# Patient Record
Sex: MALE | Race: WHITE | NOT HISPANIC OR LATINO | Employment: OTHER | URBAN - METROPOLITAN AREA
[De-identification: names, ages, dates, MRNs, and addresses within clinical notes are randomized per-mention and may not be internally consistent; named-entity substitution may affect disease eponyms.]

---

## 2024-01-15 ENCOUNTER — OFFICE VISIT (OUTPATIENT)
Dept: INTERNAL MEDICINE CLINIC | Facility: OTHER | Age: 59
End: 2024-01-15
Payer: MEDICARE

## 2024-01-15 VITALS
BODY MASS INDEX: 24.07 KG/M2 | TEMPERATURE: 98.1 F | OXYGEN SATURATION: 98 % | HEIGHT: 68 IN | HEART RATE: 72 BPM | DIASTOLIC BLOOD PRESSURE: 98 MMHG | WEIGHT: 158.8 LBS | SYSTOLIC BLOOD PRESSURE: 148 MMHG

## 2024-01-15 DIAGNOSIS — Z76.89 ENCOUNTER TO ESTABLISH CARE: ICD-10-CM

## 2024-01-15 DIAGNOSIS — Z11.4 SCREENING FOR HIV (HUMAN IMMUNODEFICIENCY VIRUS): ICD-10-CM

## 2024-01-15 DIAGNOSIS — Z91.51 HISTORY OF SUICIDE ATTEMPT: ICD-10-CM

## 2024-01-15 DIAGNOSIS — F33.1 MODERATE RECURRENT MAJOR DEPRESSION (HCC): ICD-10-CM

## 2024-01-15 DIAGNOSIS — Z11.59 NEED FOR HEPATITIS C SCREENING TEST: ICD-10-CM

## 2024-01-15 DIAGNOSIS — I10 PRIMARY HYPERTENSION: ICD-10-CM

## 2024-01-15 DIAGNOSIS — Z12.5 SCREENING FOR MALIGNANT NEOPLASM OF PROSTATE: ICD-10-CM

## 2024-01-15 DIAGNOSIS — F33.1 MODERATE EPISODE OF RECURRENT MAJOR DEPRESSIVE DISORDER (HCC): Primary | ICD-10-CM

## 2024-01-15 DIAGNOSIS — Z12.11 ENCOUNTER FOR SCREENING FOR MALIGNANT NEOPLASM OF COLON: ICD-10-CM

## 2024-01-15 DIAGNOSIS — Z13.220 SCREENING FOR LIPID DISORDERS: ICD-10-CM

## 2024-01-15 DIAGNOSIS — Z83.719 FAMILY HX COLONIC POLYPS: ICD-10-CM

## 2024-01-15 DIAGNOSIS — F19.11 HX OF DRUG ABUSE (HCC): ICD-10-CM

## 2024-01-15 PROBLEM — F33.0 MILD EPISODE OF RECURRENT MAJOR DEPRESSIVE DISORDER (HCC): Status: ACTIVE | Noted: 2023-06-20

## 2024-01-15 PROBLEM — T14.91XA SUICIDE ATTEMPT (HCC): Status: ACTIVE | Noted: 2024-01-15

## 2024-01-15 PROCEDURE — 99204 OFFICE O/P NEW MOD 45 MIN: CPT

## 2024-01-15 RX ORDER — LISINOPRIL 10 MG/1
10 TABLET ORAL DAILY
Qty: 30 TABLET | Refills: 1 | Status: SHIPPED | OUTPATIENT
Start: 2024-01-15

## 2024-01-15 NOTE — PROGRESS NOTES
" Assessment/Plan:    1. Moderate episode of recurrent major depressive disorder (HCC)  Assessment & Plan:  PHQ-9 score 14 today  History of depression.  Completed partial program in June/July 2023.  Currently taking Prozac 20 mg daily.  Does not have psychiatrist, is actively looking  Offered option to attend partial program again. Patient declined stating he felt he learned a lot of what he needed during last partial program  Offered increase in Prozac dosage for better control of depression.  Patient states that he is not interested in increased dose at this time.  He states that his depression has significantly improved since starting Prozac 20 mg  Notes passive SI, thoughts of \"what if I were not here \".  Denies active SI/HI plans.  History of suicide attempt in 2014 with benzodiazepine overdose  States that he thinks depression will improve when he gets drivers licence back tomorrow and can begin working again  Notes sister and brother-in-law are helpful support   Advised to call [employer] Suicide Hotline or 911 if any active thoughts of SI/HI  F/u 1 month    Orders:  -     Ambulatory referral to Psych Services; Future    2. Encounter to establish care  Comments:  PMH, meds, allergies, F Hx reviewed    3. Moderate recurrent major depression (HCC)    4. Primary hypertension  Assessment & Plan:  /98 and 148/90 in office today.  Patient states that he has had history of elevated blood pressures in the past  Will start lisinopril 10 mg.  Patient educated on use and side effects  Advised to monitor blood pressure at home.  Goal BP is < 130/80.  Contact our office for consistent elevations.    Recommend low sodium diet.  Exercise 30 minutes 5 times a week as tolerated.  Recommend yearly eye exam.    Labs ordered  F/u 1 month    Orders:  -     Comprehensive metabolic panel; Future  -     TSH, 3rd generation with Free T4 reflex; Future  -     CBC and differential; Future  -     lisinopril (ZESTRIL) 10 mg tablet; " Take 1 tablet (10 mg total) by mouth daily    5. Encounter for screening for malignant neoplasm of colon  -     Ambulatory Referral to Gastroenterology; Future    6. Family hx colonic polyps  Assessment & Plan:  Brother has hx of colon polyps  Referred to GI for colonoscopy for CRC screening     Orders:  -     Ambulatory Referral to Gastroenterology; Future    7. Screening for HIV (human immunodeficiency virus)  -     HIV 1/2 AG/AB w Reflex [facility] for 2 yr old and above; Future    8. Need for hepatitis C screening test  -     Hepatitis C Antibody; Future    9. Screening for lipid disorders  -     Lipid Panel with Direct LDL reflex; Future    10. Screening for malignant neoplasm of prostate  -     PSA, Total Screen; Future    11. History of suicide attempt  Assessment & Plan:  2014 via benzodiazepine overdose. Clean since 2014.   Currently notes passive SI  Denies active SI/HI. Denies plan. Denies access to firearms. Notes sister and brother in law provide support  Actively seeking psychiatrist. Referral given today  Advised to call national suicide hotline with any SI     Orders:  -     Ambulatory referral to Psych Services; Future    12. Hx of drug abuse (HCC)  Assessment & Plan:  History of methamphetamine abuse.  Sober since August 2022  History of benzodiazepine overdose in 2014    Orders:  -     Ambulatory referral to Psych Services; Future           Tobacco Cessation Counseling: Tobacco cessation counseling was provided. The patient is sincerely urged to quit consumption of tobacco. He is not ready to quit tobacco.         M*mobli software was used to dictate this note.  It may contain errors with dictating incorrect words or incorrect spelling. Please contact the provider directly with any questions.    Subjective:      Patient ID: Tammy Becker is a 58 y.o. male.    Patient is a 58 year old male presenting to the office to establish care. He notes a history of depression and methamphetamine abuse. He also is  "concerned regarding his blood pressure.    Hx of depression  Currently on Prozac which he notes has significantly helped  Passive SI thoughts in past without active thoughts or plan. Notes thoughts of \"what if I weren't here\"  Denies active SI/HI plan. Denies access to firearms  Suicide attempt in 2014 with benzodiazepine overdose. Clean from benzos since 2014  Sister and brother in law provide support   Seen in ED for this June 2023- was seen by [facility] and discharged, completed outpatient partial program  Does not have psychiatrist currently, is actively looking  Notes depression also related to not being able to drive.  Patient has license suspended due to DUI with methamphetamine use.  He states that he receives his license back tomorrow    Hypertension  /98 today  Patient notes that he has always had a history of high blood pressure, not evaluated for this in the past    Tobacco: currently smoking 10 cigarettes per day, has been weaning use   Alcohol: occasional, approx 3 drinks per week   Drugs: Hx of methamphetamine use- last August 2022, uses medical marijuana      CRC: no previous screening, family history of colonic polyps  Immunizations:     Depression  This is a recurrent problem. The current episode started more than 1 year ago. The problem has been gradually improving. Pertinent negatives include no abdominal pain, chest pain, chills, congestion, coughing, fatigue, fever, headaches, nausea, numbness, rash, sore throat, vomiting or weakness. The treatment provided moderate relief.   Hypertension  This is a chronic problem. The current episode started more than 1 year ago. The problem is uncontrolled. Associated symptoms include anxiety. Pertinent negatives include no blurred vision, chest pain, headaches, malaise/fatigue, orthopnea, palpitations, peripheral edema, PND, shortness of breath or sweats. Risk factors for coronary artery disease include male gender, family history and smoking/tobacco " exposure.       The following portions of the patient's history were reviewed and updated as appropriate: allergies, current medications, past family history, past medical history, past social history, past surgical history, and problem list.    Review of Systems   Constitutional:  Negative for chills, fatigue, fever and malaise/fatigue.   HENT:  Negative for congestion, ear pain, postnasal drip, rhinorrhea, sinus pressure, sore throat and trouble swallowing.    Eyes:  Negative for blurred vision, pain and visual disturbance.   Respiratory:  Negative for cough, chest tightness, shortness of breath and wheezing.    Cardiovascular:  Negative for chest pain, palpitations, orthopnea, leg swelling and PND.   Gastrointestinal:  Negative for abdominal pain, blood in stool, nausea and vomiting.   Genitourinary:  Negative for difficulty urinating, dysuria and hematuria.   Skin:  Negative for color change, rash and wound.   Neurological:  Negative for dizziness, weakness, light-headedness, numbness and headaches.   Psychiatric/Behavioral:  Positive for depression and dysphoric mood. Negative for sleep disturbance. The patient is nervous/anxious.    All other systems reviewed and are negative.        Past Medical History:   Diagnosis Date    Anxiety     Depression     H/O tooth extraction          Current Outpatient Medications:     FLUoxetine (PROzac) 20 mg capsule, Take 20 mg by mouth daily, Disp: , Rfl:     ibuprofen (MOTRIN) 200 mg tablet, Take 400 mg by mouth every 6 (six) hours as needed, Disp: , Rfl:     lisinopril (ZESTRIL) 10 mg tablet, Take 1 tablet (10 mg total) by mouth daily, Disp: 30 tablet, Rfl: 1    No Known Allergies    Social History   Past Surgical History:   Procedure Laterality Date    TOOTH EXTRACTION  11/2023     Family History   Problem Relation Age of Onset    Hypertension Mother     Parkinsonism Mother     Breast cancer Mother     Colon polyps Brother        Objective:  /98 (BP Location: Right  "arm, Patient Position: Sitting, Cuff Size: Standard)   Pulse 72   Temp 98.1 °F (36.7 °C) (Temporal)   Ht 5' 8\" (1.727 m)   Wt 72 kg (158 lb 12.8 oz)   SpO2 98%   BMI 24.15 kg/m²      Physical Exam  Vitals and nursing note reviewed.   Constitutional:       General: He is not in acute distress.     Appearance: Normal appearance. He is not ill-appearing.   HENT:      Head: Normocephalic and atraumatic.      Right Ear: External ear normal.      Left Ear: External ear normal.      Nose: Nose normal.      Mouth/Throat:      Mouth: Mucous membranes are moist.      Pharynx: Oropharynx is clear. No posterior oropharyngeal erythema.   Eyes:      General: No scleral icterus.     Conjunctiva/sclera: Conjunctivae normal.      Pupils: Pupils are equal, round, and reactive to light.   Cardiovascular:      Rate and Rhythm: Normal rate and regular rhythm.      Heart sounds: Normal heart sounds. No murmur heard.     No friction rub. No gallop.   Pulmonary:      Effort: Pulmonary effort is normal. No respiratory distress.      Breath sounds: Normal breath sounds. No wheezing, rhonchi or rales.   Musculoskeletal:      Right lower leg: No edema.      Left lower leg: No edema.   Skin:     General: Skin is warm and dry.      Coloration: Skin is not pale.      Findings: No erythema.   Neurological:      General: No focal deficit present.      Mental Status: He is alert and oriented to person, place, and time. Mental status is at baseline.      Motor: No weakness.      Coordination: Coordination normal.   Psychiatric:         Attention and Perception: Attention normal.         Mood and Affect: Affect normal. Mood is anxious and depressed.         Speech: Speech normal.         Behavior: Behavior normal. Behavior is cooperative.         Thought Content: Thought content does not include homicidal or suicidal plan.           Depression Screening Follow-up Plan: Patient's depression screening was positive with a PHQ-2 score of 4. Their " PHQ-9 score was 14. Patient assessed for underlying major depression. They have no active suicidal ideations. Brief counseling provided and recommend additional follow-up/re-evaluation next office visit.

## 2024-01-15 NOTE — ASSESSMENT & PLAN NOTE
"PHQ-9 score 14 today  History of depression.  Completed partial program in June/July 2023.  Currently taking Prozac 20 mg daily.  Does not have psychiatrist, is actively looking  Offered option to attend partial program again. Patient declined stating he felt he learned a lot of what he needed during last partial program  Offered increase in Prozac dosage for better control of depression.  Patient states that he is not interested in increased dose at this time.  He states that his depression has significantly improved since starting Prozac 20 mg  Notes passive SI, thoughts of \"what if I were not here \".  Denies active SI/HI plans.  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

## 2024-01-15 NOTE — ASSESSMENT & PLAN NOTE
/98 and 148/90 in office today.  Patient states that he has had history of elevated blood pressures in the past  Will start lisinopril 10 mg.  Patient educated on use and side effects  Advised to monitor blood pressure at home.  Goal BP is < 130/80.  Contact our office for consistent elevations.    Recommend low sodium diet.  Exercise 30 minutes 5 times a week as tolerated.  Recommend yearly eye exam.    Labs ordered  F/u 1 month

## 2024-01-15 NOTE — PATIENT INSTRUCTIONS
Advised to monitor blood pressure at home.  Goal BP is < 130/80  Recommend low sodium diet.  Exercise 30 minutes 5 times a week as tolerated.  Recommend yearly eye exam.      Depression   AMBULATORY CARE:   Depression  is a mood disorder that causes feelings of sadness or hopelessness that do not go away. Depression may cause you to lose interest in things you used to enjoy. These feelings may interfere with your daily life.  Common signs and symptoms:   Appetite changes, or weight gain or loss    Trouble falling or staying asleep, or sleeping too much    Fatigue (being mentally and physically tired) or lack of energy    Feeling restless, irritable, or withdrawn    Feeling worthless, hopeless, discouraged, or guilty    Trouble concentrating, remembering things, doing daily tasks, or making decisions    Thoughts about hurting or killing yourself    Call your local emergency number (911 in the ) if:   You think about hurting yourself or someone else.    You have done something on purpose to hurt yourself.    Call your therapist or doctor if:   Your symptoms get worse or do not get better with treatment.    Your depression keeps you from doing your regular daily activities.    You have new symptoms since your last visit.    You have questions or concerns about your condition or care.    The following resources are available at any time to help you, if needed:   Contact a suicide prevention organization:        For the [employer] Suicide and Crisis Lifeline:     Call or text [employer]     Send a chat on https://[employer].org/chat     Call 3-172-568-3586 (1-800-273-TALK)    For the Suicide Hotline, call 9-757-078-2034 (4-010-VQXHTVF)    For a list of international numbers: https://save.org/find-help/international-resources/  Treatment for depression  depends on how severe your symptoms are. You may need any of the following:  Cognitive behavioral therapy (CBT)  teaches you how to identify and change negative thought  patterns.    Antidepressant medicine  may be given to decrease or manage symptoms. You may need to take this medicine for several weeks before they start working.    Self-care:   Talk to someone about your depression.  Your healthcare provider may suggest counseling. You might feel more comfortable talking with a friend or family member about your depression. Choose someone you know will be supportive and encouraging.    Get regular physical activity.  Physical activity can lower your stress, improve your mood, and help you sleep better. Work with your healthcare provider to develop a plan that you enjoy.         Create a regular sleep schedule.  A routine can help you relax before bed. Listen to music, read, or do yoga. Try to go to bed and wake up at the same time every day. Sleep is important for emotional health.    Eat a variety of healthy foods.  Healthy foods include fruits, vegetables, whole-grain breads, low-fat dairy products, lean meats, fish, and cooked beans. A healthy meal plan is low in fat, salt, and added sugar.         Do not use alcohol, drugs, or nicotine products.  These can worsen depression or make it hard to manage. Talk to your therapist or healthcare provider if you use any of these products and need help to quit.    Follow up with your therapist or doctor as directed:  Your healthcare provider will monitor your progress at follow-up visits. Your provider will also monitor your medicine if you take antidepressants and ask if the medicine is helping. Tell your provider about any side effects or problems you have with your medicine. The type or amount of medicine may need to be changed. Write down your questions so you remember to ask them during your visits.  For more information or support:   National [place] [employer]  [address]  [place] [address]  Phone: 6- 520 - 624-2313  Phone: 9- 373 - 484-5168  Web Address: http://www.branden.[employer]  [facility] Suicide and Crisis  Lifeline  [address]  [place] [address]  Phone: 7- 409 - 935  Web Address: http://www.suicidepreventionlifeline.org OR https://[employer]/chat/    © Copyright [employer] 2023 Information is for End User's use only and may not be sold, redistributed or otherwise used for commercial purposes.  The above information is an  only. It is not intended as medical advice for individual conditions or treatments. Talk to your doctor, nurse or pharmacist before following any medical regimen to see if it is safe and effective for you.

## 2024-01-15 NOTE — ASSESSMENT & PLAN NOTE
History of methamphetamine abuse.  Sober since August 2022  History of benzodiazepine overdose in 2014

## 2024-01-15 NOTE — ASSESSMENT & PLAN NOTE
2014 via benzodiazepine overdose. Clean since 2014.   Currently notes passive SI  Denies active SI/HI. Denies plan. Denies access to firearms. Notes sister and brother in law provide support  Actively seeking psychiatrist. Referral given today  Advised to call national suicide hotline with any SI

## 2024-01-17 ENCOUNTER — TELEPHONE (OUTPATIENT)
Dept: PSYCHIATRY | Facility: CLINIC | Age: 59
End: 2024-01-17

## 2024-01-17 NOTE — TELEPHONE ENCOUNTER
IC left voice message for pt about referral that was received for services and possible placement on the wait list.

## 2024-01-22 NOTE — TELEPHONE ENCOUNTER
IC called pt to discuss referral that was received for services.    Patient has been added to the Med[facility] wait list with a referral.    Insurance: [employer] [place] [facility]  Insurance Type:    Commercial []   Medicaid [x]   [facility] (if applicable)   Medicare []  [facility]  Provider Preference: none  Virtual: Yes [] No [x]  Were outside resources sent: Yes [] No [x]

## 2024-01-25 ENCOUNTER — TELEPHONE (OUTPATIENT)
Dept: GASTROENTEROLOGY | Facility: MEDICAL CENTER | Age: 59
End: 2024-01-25

## 2024-01-25 ENCOUNTER — CONSULT (OUTPATIENT)
Dept: GASTROENTEROLOGY | Facility: MEDICAL CENTER | Age: 59
End: 2024-01-25
Payer: MEDICARE

## 2024-01-25 VITALS
DIASTOLIC BLOOD PRESSURE: 59 MMHG | HEART RATE: 62 BPM | TEMPERATURE: 62 F | BODY MASS INDEX: 23.95 KG/M2 | HEIGHT: 68 IN | WEIGHT: 158 LBS | SYSTOLIC BLOOD PRESSURE: 95 MMHG | OXYGEN SATURATION: 98 %

## 2024-01-25 DIAGNOSIS — K62.5 RECTAL BLEEDING: ICD-10-CM

## 2024-01-25 DIAGNOSIS — Z83.719 FAMILY HX COLONIC POLYPS: Primary | ICD-10-CM

## 2024-01-25 DIAGNOSIS — Z72.0 TOBACCO USE: ICD-10-CM

## 2024-01-25 DIAGNOSIS — Z12.11 ENCOUNTER FOR SCREENING FOR MALIGNANT NEOPLASM OF COLON: ICD-10-CM

## 2024-01-25 PROCEDURE — 99244 OFF/OP CNSLTJ NEW/EST MOD 40: CPT | Performed by: STUDENT IN AN ORGANIZED HEALTH CARE EDUCATION/TRAINING PROGRAM

## 2024-01-25 NOTE — PROGRESS NOTES
[facility] - Outpatient Consultation  Tammy Becker 58 y.o. male MRN: [id number]  Encounter: [id number]          ASSESSMENT AND PLAN:    58M with HTN, anxiety referred for family history of colon polyps.  No prior colonoscopy, no red flags.  On review of family history, sounds like his brother has likely had advanced adenomas so likely warrants escalated screening.  1. Family hx colonic polyps  2. Encounter for screening for malignant neoplasm of colon  - Ambulatory Referral to Gastroenterology  - Colonoscopy; Future  - polyethylene glycol (COLYTE) 4000 mL solution; Take 4,000 mL by mouth once for 1 dose  Dispense: 4000 mL; Refill: 0    3. Rectal bleeding  Colonoscopy as above    4. Tobacco use  Discussed that tobacco use increases risk of colon polyps in addition to other malignancies and other problems.  Not currently interested in quitting.      ______________________________________________________________________    HPI:    Never had a colonoscopy.    No constipation, diarrhea. Bowels are intermittently irregular but never consistnet diarrhea or constipation.  Rare blood with wiping, always resolves within a day. No heartburn, nausea, or vomiting.    Mother, sister and brother had polyps. Brother told him he needs to get a colonoscopy due to the polyps he's had/  Reviewed 1/15/24 PCP visit    6/14/23 CBC unremarkable, CMP with 1.4, TSH normal    Smokes 1/2 PPD.  Takes MJ edible once a week.    REVIEW OF SYSTEMS:    CONSTITUTIONAL: Denies any fever, chills, rigors, and weight loss.  HEENT: No earache or tinnitus. Denies hearing loss or visual disturbances.  CARDIOVASCULAR: No chest pain or palpitations.   RESPIRATORY: Denies any cough, hemoptysis, shortness of breath or dyspnea on exertion.  GASTROINTESTINAL: As noted in the History of Present Illness.   GENITOURINARY: No problems with urination. Denies any hematuria or dysuria.  NEUROLOGIC: No dizziness or vertigo, denies headaches.  "  MUSCULOSKELETAL: Denies any muscle or joint pain.   SKIN: Denies skin rashes or itching.   ENDOCRINE: Denies excessive thirst. Denies intolerance to heat or cold.  PSYCHOSOCIAL: Denies depression or anxiety. Denies any recent memory loss.       Historical Information   Past Medical History:   Diagnosis Date    Anxiety     Depression     H/O tooth extraction      Past Surgical History:   Procedure Laterality Date    TOOTH EXTRACTION  11/2023     Social History   Social History     Substance and Sexual Activity   Alcohol Use Yes    Comment: Social     Social History     Substance and Sexual Activity   Drug Use Yes    Frequency: 1.0 times per week    Types: Marijuana    Comment: Medical     Social History     Tobacco Use   Smoking Status Every Day    Current packs/day: 0.50    Average packs/day: 0.5 packs/day for 44.1 years (22.0 ttl pk-yrs)    Types: Cigarettes    Start date: 1980   Smokeless Tobacco Former    Types: Chew    Quit date: 2014     Family History   Problem Relation Age of Onset    Hypertension Mother     Parkinsonism Mother     Breast cancer Mother     Colon polyps Brother        Meds/Allergies       Current Outpatient Medications:     FLUoxetine (PROzac) 20 mg capsule    ibuprofen (MOTRIN) 200 mg tablet    lisinopril (ZESTRIL) 10 mg tablet    No Known Allergies        Objective     Blood pressure 95/59, pulse 62, temperature (!) 62 °F (16.7 °C), height 5' 8\" (1.727 m), weight 71.7 kg (158 lb), SpO2 98%. Body mass index is 24.02 kg/m².        PHYSICAL EXAM:      General Appearance:   Alert, cooperative, no distress   HEENT:   Normocephalic, atraumatic, anicteric.     Neck:  Supple, symmetrical, trachea midline   Lungs:   Clear to auscultation bilaterally; no rales, rhonchi or wheezing; respirations unlabored    Heart::   Regular rate and rhythm; no murmur, rub, or gallop.   Abdomen:   Soft, non-tender, non-distended; normal bowel sounds; no masses, no organomegaly    Genitalia:   Deferred    Rectal:   " Deferred    Extremities:  No cyanosis, clubbing or edema    Pulses:  2+ and symmetric    Skin:  No jaundice, rashes, or lesions    Lymph nodes:  No palpable cervical lymphadenopathy        Lab Results:   No visits with results within 1 Day(s) from this visit.   Latest known visit with results is:   No results found for any previous visit.         Radiology Results:   No results found.

## 2024-01-31 ENCOUNTER — APPOINTMENT (OUTPATIENT)
Dept: LAB | Facility: IMAGING CENTER | Age: 59
End: 2024-01-31
Payer: MEDICARE

## 2024-01-31 DIAGNOSIS — Z12.5 SCREENING FOR MALIGNANT NEOPLASM OF PROSTATE: ICD-10-CM

## 2024-01-31 DIAGNOSIS — Z11.4 SCREENING FOR HIV (HUMAN IMMUNODEFICIENCY VIRUS): ICD-10-CM

## 2024-01-31 DIAGNOSIS — I10 PRIMARY HYPERTENSION: ICD-10-CM

## 2024-01-31 DIAGNOSIS — Z11.59 NEED FOR HEPATITIS C SCREENING TEST: ICD-10-CM

## 2024-01-31 DIAGNOSIS — Z13.220 SCREENING FOR LIPID DISORDERS: ICD-10-CM

## 2024-01-31 LAB
ALBUMIN SERPL BCP-MCNC: 4.3 G/DL (ref 3.5–5)
ALP SERPL-CCNC: 60 U/L (ref 34–104)
ALT SERPL W P-5'-P-CCNC: 13 U/L (ref 7–52)
ANION GAP SERPL CALCULATED.3IONS-SCNC: 8 MMOL/L
AST SERPL W P-5'-P-CCNC: 15 U/L (ref 13–39)
BASOPHILS # BLD AUTO: 0.04 THOUSANDS/ÂΜL (ref 0–0.1)
BASOPHILS NFR BLD AUTO: 1 % (ref 0–1)
BILIRUB SERPL-MCNC: 1.09 MG/DL (ref 0.2–1)
BUN SERPL-MCNC: 12 MG/DL (ref 5–25)
CALCIUM SERPL-MCNC: 9.9 MG/DL (ref 8.4–10.2)
CHLORIDE SERPL-SCNC: 102 MMOL/L (ref 96–108)
CHOLEST SERPL-MCNC: 201 MG/DL
CO2 SERPL-SCNC: 28 MMOL/L (ref 21–32)
CREAT SERPL-MCNC: 0.93 MG/DL (ref 0.6–1.3)
EOSINOPHIL # BLD AUTO: 0.21 THOUSAND/ÂΜL (ref 0–0.61)
EOSINOPHIL NFR BLD AUTO: 3 % (ref 0–6)
ERYTHROCYTE [DISTWIDTH] IN BLOOD BY AUTOMATED COUNT: 13.2 % (ref 11.6–15.1)
GFR SERPL CREATININE-BSD FRML MDRD: 90 ML/MIN/1.73SQ M
GLUCOSE P FAST SERPL-MCNC: 88 MG/DL (ref 65–99)
HCT VFR BLD AUTO: 50.4 % (ref 36.5–49.3)
HCV AB SER QL: NORMAL
HDLC SERPL-MCNC: 63 MG/DL
HGB BLD-MCNC: 16.9 G/DL (ref 12–17)
IMM GRANULOCYTES # BLD AUTO: 0.04 THOUSAND/UL (ref 0–0.2)
IMM GRANULOCYTES NFR BLD AUTO: 1 % (ref 0–2)
LDLC SERPL CALC-MCNC: 117 MG/DL (ref 0–100)
LYMPHOCYTES # BLD AUTO: 1.72 THOUSANDS/ÂΜL (ref 0.6–4.47)
LYMPHOCYTES NFR BLD AUTO: 28 % (ref 14–44)
MCH RBC QN AUTO: 31.4 PG (ref 26.8–34.3)
MCHC RBC AUTO-ENTMCNC: 33.5 G/DL (ref 31.4–37.4)
MCV RBC AUTO: 94 FL (ref 82–98)
MONOCYTES # BLD AUTO: 0.48 THOUSAND/ÂΜL (ref 0.17–1.22)
MONOCYTES NFR BLD AUTO: 8 % (ref 4–12)
NEUTROPHILS # BLD AUTO: 3.72 THOUSANDS/ÂΜL (ref 1.85–7.62)
NEUTS SEG NFR BLD AUTO: 59 % (ref 43–75)
NRBC BLD AUTO-RTO: 0 /100 WBCS
PLATELET # BLD AUTO: 346 THOUSANDS/UL (ref 149–390)
PMV BLD AUTO: 9.5 FL (ref 8.9–12.7)
POTASSIUM SERPL-SCNC: 5.6 MMOL/L (ref 3.5–5.3)
PROT SERPL-MCNC: 7 G/DL (ref 6.4–8.4)
PSA SERPL-MCNC: 0.73 NG/ML (ref 0–4)
RBC # BLD AUTO: 5.39 MILLION/UL (ref 3.88–5.62)
SODIUM SERPL-SCNC: 138 MMOL/L (ref 135–147)
TRIGL SERPL-MCNC: 104 MG/DL
TSH SERPL DL<=0.05 MIU/L-ACNC: 4.39 UIU/ML (ref 0.45–4.5)
WBC # BLD AUTO: 6.21 THOUSAND/UL (ref 4.31–10.16)

## 2024-01-31 PROCEDURE — 36415 COLL VENOUS BLD VENIPUNCTURE: CPT

## 2024-01-31 PROCEDURE — 80061 LIPID PANEL: CPT

## 2024-01-31 PROCEDURE — G0103 PSA SCREENING: HCPCS

## 2024-01-31 PROCEDURE — 80053 COMPREHEN METABOLIC PANEL: CPT

## 2024-01-31 PROCEDURE — 85025 COMPLETE CBC W/AUTO DIFF WBC: CPT

## 2024-01-31 PROCEDURE — 87389 HIV-1 AG W/HIV-1&-2 AB AG IA: CPT

## 2024-01-31 PROCEDURE — 86803 HEPATITIS C AB TEST: CPT

## 2024-01-31 PROCEDURE — 84443 ASSAY THYROID STIM HORMONE: CPT

## 2024-02-01 ENCOUNTER — TELEPHONE (OUTPATIENT)
Dept: INTERNAL MEDICINE CLINIC | Facility: OTHER | Age: 59
End: 2024-02-01

## 2024-02-01 DIAGNOSIS — E87.5 HYPERKALEMIA: ICD-10-CM

## 2024-02-01 DIAGNOSIS — I10 PRIMARY HYPERTENSION: Primary | ICD-10-CM

## 2024-02-01 LAB
HIV 1+2 AB+HIV1 P24 AG SERPL QL IA: NORMAL
HIV 2 AB SERPL QL IA: NORMAL
HIV1 AB SERPL QL IA: NORMAL
HIV1 P24 AG SERPL QL IA: NORMAL

## 2024-02-01 RX ORDER — AMLODIPINE BESYLATE 5 MG/1
5 TABLET ORAL DAILY
Qty: 30 TABLET | Refills: 0 | Status: SHIPPED | OUTPATIENT
Start: 2024-02-01

## 2024-02-01 NOTE — TELEPHONE ENCOUNTER
Covering providers bin, CMP showed hyperkalemia 5.6. he was recently started on lisinopril 10mg daily. Called and discussed with patient, advised to stop lisinopril and start amlodipine 5mg daily. Advised to follow a low potassium diet and repeat his bmp in 1 week.     Will send to [facility] as [facility]

## 2024-02-08 ENCOUNTER — APPOINTMENT (OUTPATIENT)
Dept: LAB | Facility: IMAGING CENTER | Age: 59
End: 2024-02-08
Payer: MEDICARE

## 2024-02-08 DIAGNOSIS — E87.5 HYPERKALEMIA: ICD-10-CM

## 2024-02-08 PROCEDURE — 36415 COLL VENOUS BLD VENIPUNCTURE: CPT

## 2024-02-08 PROCEDURE — 80048 BASIC METABOLIC PNL TOTAL CA: CPT

## 2024-02-09 LAB
ANION GAP SERPL CALCULATED.3IONS-SCNC: 16 MMOL/L
BUN SERPL-MCNC: 19 MG/DL (ref 5–25)
CALCIUM SERPL-MCNC: 9.6 MG/DL (ref 8.4–10.2)
CHLORIDE SERPL-SCNC: 101 MMOL/L (ref 96–108)
CO2 SERPL-SCNC: 22 MMOL/L (ref 21–32)
CREAT SERPL-MCNC: 0.92 MG/DL (ref 0.6–1.3)
GFR SERPL CREATININE-BSD FRML MDRD: 91 ML/MIN/1.73SQ M
GLUCOSE P FAST SERPL-MCNC: 69 MG/DL (ref 65–99)
POTASSIUM SERPL-SCNC: 4.5 MMOL/L (ref 3.5–5.3)
SODIUM SERPL-SCNC: 139 MMOL/L (ref 135–147)

## 2024-02-12 ENCOUNTER — OFFICE VISIT (OUTPATIENT)
Dept: INTERNAL MEDICINE CLINIC | Facility: OTHER | Age: 59
End: 2024-02-12
Payer: MEDICARE

## 2024-02-12 VITALS
RESPIRATION RATE: 18 BRPM | HEART RATE: 54 BPM | DIASTOLIC BLOOD PRESSURE: 88 MMHG | WEIGHT: 159.61 LBS | TEMPERATURE: 98 F | SYSTOLIC BLOOD PRESSURE: 128 MMHG | HEIGHT: 68 IN | BODY MASS INDEX: 24.19 KG/M2 | OXYGEN SATURATION: 100 %

## 2024-02-12 DIAGNOSIS — I10 PRIMARY HYPERTENSION: ICD-10-CM

## 2024-02-12 DIAGNOSIS — Z12.2 SCREENING FOR LUNG CANCER: ICD-10-CM

## 2024-02-12 DIAGNOSIS — F33.1 MODERATE EPISODE OF RECURRENT MAJOR DEPRESSIVE DISORDER (HCC): ICD-10-CM

## 2024-02-12 DIAGNOSIS — F17.210 SMOKING GREATER THAN 20 PACK YEARS: ICD-10-CM

## 2024-02-12 DIAGNOSIS — E78.2 MIXED HYPERLIPIDEMIA: ICD-10-CM

## 2024-02-12 DIAGNOSIS — Z00.00 ANNUAL PHYSICAL EXAM: Primary | ICD-10-CM

## 2024-02-12 PROCEDURE — 99214 OFFICE O/P EST MOD 30 MIN: CPT

## 2024-02-12 PROCEDURE — 99396 PREV VISIT EST AGE 40-64: CPT

## 2024-02-12 RX ORDER — AMLODIPINE BESYLATE 5 MG/1
5 TABLET ORAL DAILY
Qty: 90 TABLET | Refills: 1 | Status: SHIPPED | OUTPATIENT
Start: 2024-02-12

## 2024-02-12 RX ORDER — FLUOXETINE HYDROCHLORIDE 20 MG/1
20 CAPSULE ORAL DAILY
Qty: 90 CAPSULE | Refills: 1 | Status: SHIPPED | OUTPATIENT
Start: 2024-02-12

## 2024-02-12 NOTE — ASSESSMENT & PLAN NOTE
BP controlled, 128/88 today  Continue amlodipine 5 mg daily  Advised to monitor blood pressure at home.  Goal BP is < 130/80.  Contact our office for consistent elevations.    Recommend low sodium diet.  Exercise 30 minutes 5 times a week as tolerated.  Recommend yearly eye exam.

## 2024-02-12 NOTE — PROGRESS NOTES
ADULT ANNUAL PHYSICAL  [facility] PRIMARY CARE [place]    NAME: Tammy Becker  AGE: 58 y.o. SEX: male  : 1965     DATE: 2024     Assessment and Plan:     Problem List Items Addressed This Visit          Cardiovascular and Mediastinum    Primary hypertension     BP controlled, 128/88 today  Continue amlodipine 5 mg daily  Advised to monitor blood pressure at home.  Goal BP is < 130/80.  Contact our office for consistent elevations.    Recommend low sodium diet.  Exercise 30 minutes 5 times a week as tolerated.  Recommend yearly eye exam.           Relevant Medications    amLODIPine (NORVASC) 5 mg tablet    Other Relevant Orders    CBC and Platelet    Comprehensive metabolic panel       Other    Moderate episode of recurrent major depressive disorder (HCC)     Noting depression is becoming better controlled  Notes that he recently got his 's license back, and restarted work which has helped with his depression  Continue Prozac 20 mg daily  Currently on wait list for psychiatry services  No SI/HI reported today  Advised to call [employer] suicide hotline or 911 if any active thoughts of SI/HI  Follow-up in 6 months or sooner if symptoms worsen         Relevant Medications    FLUoxetine (PROzac) 20 mg capsule    Mixed hyperlipidemia     Total cholesterol 201, triglycerides 104, HDL 63,   ASCVD risk score 11.3%  Advised diet and exercise.  Advised decreasing intake of cholesterol/fat.  Increase lean protein intake, increase fiber intake  Advised smoking cessation  Discussed statin versus lifestyle modifications for reducing cholesterol.  Shared decision making with patient that he would like to try modifying diet and exercise at this time  Repeat in 6 months         Relevant Orders    Lipid panel    Smoking greater than 20 pack years     Current smoker  Notes a growing interest in quitting, however does not feel completely elevated at this  time  Discussed options for smoking cessation with patient  States that he would like to try and reduce or quit smoking over neck 6 months, will call if he would like medical assistance  Notes that controlling depression also helps with motivation to quit  Follow-up in 6 months or sooner if patient expresses interest and requires help with quitting         Relevant Orders    CT lung screening program    Annual physical exam - Primary     Discussed healthy diet and exercise  Reviewed screenings and immunizations with patient  States that he will schedule CT lung cancer screening  Colonoscopy scheduled for March 2024  Routine labs reviewed today          Other Visit Diagnoses       Screening for lung cancer        Relevant Orders    CT lung screening program            Immunizations and preventive care screenings were discussed with patient today. Appropriate education was printed on patient's after visit summary.    Discussed risks and benefits of prostate cancer screening. We discussed the controversial history of PSA screening for prostate cancer in the [place] as well as the risk of over detection and over treatment of prostate cancer by way of PSA screening.  The patient understands that PSA blood testing is an imperfect way to screen for prostate cancer and that elevated PSA levels in the blood may also be caused by infection, inflammation, prostatic trauma or manipulation, urological procedures, or by benign prostatic enlargement.    The role of the digital rectal examination in prostate cancer screening was also discussed and I discussed with him that there is large interobserver variability in the findings of digital rectal examination.    Counseling:  Alcohol/drug use: discussed moderation in alcohol intake, the recommendations for healthy alcohol use, and avoidance of illicit drug use.  Dental Health: discussed importance of regular tooth brushing, flossing, and dental visits.  Injury prevention:  discussed safety/seat belts, safety helmets, smoke detectors, carbon dioxide detectors, and smoking near bedding or upholstery.  Sexual health: discussed sexually transmitted diseases, partner selection, use of condoms, avoidance of unintended pregnancy, and contraceptive alternatives.  Exercise: the importance of regular exercise/physical activity was discussed. Recommend exercise 3-5 times per week for at least 30 minutes.          Return in 6 months (on 8/12/2024) for Next scheduled follow up.     Chief Complaint:     Chief Complaint   Patient presents with    Follow-up     1 month - labs done 1/31/24         Annual, lung ct scan      History of Present Illness:     Adult Annual Physical   Patient here for a comprehensive physical exam. The patient reports no problems.    He is also here to review labs.     Initially from hyperkalemia with potassium 5.6 on initial blood work.  Lisinopril was discontinued, switch to amlodipine  Potassium has normalized to 4.5 on 2-8-2024    Hyperlipidemia  Total cholesterol 201, triglycerides 104, HDL 63,     Depression  States that he is feels better controlled now that he has got his license back and started working  Worsening [employer] homes  Denies SI/HI today  On wait list for psychiatry    Tobacco cessation  Continue to smoke  Notes that urges to smoke decrease with control depression  Growing interest in quitting, however, does not feel completely motivated at this time      Diet and Physical Activity  Diet/Nutrition: well balanced diet, limited junk food, low fat diet, and consuming 3-5 servings of fruits/vegetables daily.   Exercise: no formal exercise.      Depression Screening  PHQ-2/9 Depression Screening           General Health  Sleep: sleeps well and gets 7-8 hours of sleep on average.   Hearing: normal - bilateral.  Vision: no vision problems, most recent eye exam >1 year ago, and wears glasses and contacts.   Dental: regular dental visits and brushes  teeth twice daily.        Health  Symptoms include: none    Advanced Care Planning  Do you have an advanced directive? no  Do you have a durable medical power of ? no     Review of Systems:     Review of Systems   Constitutional:  Negative for chills, fatigue and fever.   HENT:  Negative for congestion, ear pain, postnasal drip, rhinorrhea, sinus pressure, sore throat and trouble swallowing.    Eyes:  Negative for pain and visual disturbance.   Respiratory:  Negative for cough, chest tightness, shortness of breath and wheezing.    Cardiovascular:  Negative for chest pain, palpitations and leg swelling.   Gastrointestinal:  Negative for abdominal pain, blood in stool, nausea and vomiting.   Genitourinary:  Negative for difficulty urinating, dysuria and hematuria.   Musculoskeletal:  Negative for arthralgias and back pain.   Skin:  Negative for color change, rash and wound.   Neurological:  Negative for dizziness, weakness, light-headedness, numbness and headaches.   Psychiatric/Behavioral:  Negative for dysphoric mood and sleep disturbance. The patient is not nervous/anxious.    All other systems reviewed and are negative.     Past Medical History:     Past Medical History:   Diagnosis Date    Anxiety     Depression     H/O tooth extraction       Past Surgical History:     Past Surgical History:   Procedure Laterality Date    TOOTH EXTRACTION  11/2023      Family History:     Family History   Problem Relation Age of Onset    Hypertension Mother     Parkinsonism Mother     Breast cancer Mother     Colon polyps Brother       Social History:     Social History     Socioeconomic History    Marital status: Legally      Spouse name: None    Number of children: None    Years of education: None    Highest education level: None   Occupational History    None   Tobacco Use    Smoking status: Every Day     Current packs/day: 0.50     Average packs/day: 0.5 packs/day for 44.1 years (22.1 ttl pk-yrs)     Types:  "Cigarettes     Start date: 1980    Smokeless tobacco: Former     Types: Chew     Quit date: 2014   Vaping Use    Vaping status: Never Used   Substance and Sexual Activity    Alcohol use: Yes     Comment: Social    Drug use: Yes     Frequency: 1.0 times per week     Types: Marijuana     Comment: Medical    Sexual activity: None   Other Topics Concern    None   Social History Narrative    None     Social Determinants of Health     Financial Resource Strain: Not on file   Food Insecurity: Not on file   Transportation Needs: Not on file   Physical Activity: Not on file   Stress: Not on file   Social Connections: Not on file   Intimate Partner Violence: Not on file   Housing Stability: Not on file      Current Medications:     Current Outpatient Medications   Medication Sig Dispense Refill    amLODIPine (NORVASC) 5 mg tablet Take 1 tablet (5 mg total) by mouth daily 90 tablet 1    FLUoxetine (PROzac) 20 mg capsule Take 1 capsule (20 mg total) by mouth daily 90 capsule 1    ibuprofen (MOTRIN) 200 mg tablet Take 400 mg by mouth every 6 (six) hours as needed      polyethylene glycol (COLYTE) 4000 mL solution Take 4,000 mL by mouth once for 1 dose 4000 mL 0     No current facility-administered medications for this visit.      Allergies:     No Known Allergies   Physical Exam:     /88 (BP Location: Right arm, Patient Position: Sitting, Cuff Size: Standard)   Pulse (!) 54   Temp 98 °F (36.7 °C) (Temporal)   Resp 18   Ht 5' 8\" (1.727 m)   Wt 72.4 kg (159 lb 9.8 oz)   SpO2 100%   BMI 24.27 kg/m²     Physical Exam  Vitals and nursing note reviewed.   Constitutional:       General: He is not in acute distress.     Appearance: Normal appearance. He is not ill-appearing.   HENT:      Head: Normocephalic and atraumatic.      Right Ear: Tympanic membrane, ear canal and external ear normal.      Left Ear: Tympanic membrane, ear canal and external ear normal.      Nose: Nose normal. No rhinorrhea.      Mouth/Throat:      " Mouth: Mucous membranes are moist.      Pharynx: Oropharynx is clear. No oropharyngeal exudate or posterior oropharyngeal erythema.   Eyes:      General: No scleral icterus.     Extraocular Movements: Extraocular movements intact.      Conjunctiva/sclera: Conjunctivae normal.      Pupils: Pupils are equal, round, and reactive to light.   Cardiovascular:      Rate and Rhythm: Normal rate and regular rhythm.      Heart sounds: Normal heart sounds. No murmur heard.     No friction rub. No gallop.   Pulmonary:      Effort: Pulmonary effort is normal. No respiratory distress.      Breath sounds: Normal breath sounds. No wheezing, rhonchi or rales.   Chest:      Chest wall: No tenderness.   Abdominal:      Palpations: Abdomen is soft.      Tenderness: There is no abdominal tenderness.   Musculoskeletal:      Cervical back: Normal range of motion. No tenderness.      Right lower leg: No edema.      Left lower leg: No edema.   Lymphadenopathy:      Cervical: No cervical adenopathy.   Skin:     General: Skin is warm and dry.      Coloration: Skin is not pale.      Findings: No erythema, lesion or rash.   Neurological:      General: No focal deficit present.      Mental Status: He is alert and oriented to person, place, and time. Mental status is at baseline.      Cranial Nerves: No cranial nerve deficit.      Motor: No weakness.      Coordination: Coordination normal.   Psychiatric:         Mood and Affect: Mood normal.         Behavior: Behavior normal.          Alba Trejo PA-C  [facility] PRIMARY [facility] [place]    I discussed with him that he is a candidate for lung cancer CT screening.     The following Shared Decision-Making points were covered:  Benefits of screening were discussed, including the rates of reduction in death from lung cancer and other causes.  Harms of screening were reviewed, including false positive tests, radiation exposure levels, risks of invasive procedures, risks of  complications of screening, and risk of overdiagnosis.  I counseled on the importance of adherence to annual lung cancer LDCT screening, impact of co-morbidities, and ability or willingness to undergo diagnosis and treatment.  I counseled on the importance of maintaining abstinence as a former smoker or was counseled on the importance of smoking cessation if a current smoker    Review of Eligibility Criteria: He meets all of the criteria for Lung Cancer Screening.   He is 58 y.o.   He has >20 pack year tobacco history and is a current smoker or has quit within the past 15 years  He presents no signs or symptoms of lung cancer    After discussion, the patient decided to elect lung cancer screening.

## 2024-02-12 NOTE — ASSESSMENT & PLAN NOTE
Current smoker  Notes a growing interest in quitting, however does not feel completely elevated at this time  Discussed options for smoking cessation with patient  States that he would like to try and reduce or quit smoking over neck 6 months, will call if he would like medical assistance  Notes that controlling depression also helps with motivation to quit  Follow-up in 6 months or sooner if patient expresses interest and requires help with quitting

## 2024-02-12 NOTE — ASSESSMENT & PLAN NOTE
Noting depression is becoming better controlled  Notes that he recently got his 's license back, and restarted work which has helped with his depression  Continue Prozac 20 mg daily  Currently on wait list for psychiatry services  No SI/HI reported today  Advised to call national suicide hotline or 911 if any active thoughts of SI/HI  Follow-up in 6 months or sooner if symptoms worsen

## 2024-02-12 NOTE — ASSESSMENT & PLAN NOTE
Discussed healthy diet and exercise  Reviewed screenings and immunizations with patient  States that he will schedule CT lung cancer screening  Colonoscopy scheduled for March 2024  Routine labs reviewed today

## 2024-02-12 NOTE — ASSESSMENT & PLAN NOTE
Total cholesterol 201, triglycerides 104, HDL 63,   ASCVD risk score 11.3%  Advised diet and exercise.  Advised decreasing intake of cholesterol/fat.  Increase lean protein intake, increase fiber intake  Advised smoking cessation  Discussed statin versus lifestyle modifications for reducing cholesterol.  Shared decision making with patient that he would like to try modifying diet and exercise at this time  Repeat in 6 months

## 2024-03-01 ENCOUNTER — ANESTHESIA (OUTPATIENT)
Dept: ANESTHESIOLOGY | Facility: HOSPITAL | Age: 59
End: 2024-03-01

## 2024-03-01 ENCOUNTER — ANESTHESIA EVENT (OUTPATIENT)
Dept: ANESTHESIOLOGY | Facility: HOSPITAL | Age: 59
End: 2024-03-01

## 2024-03-01 ENCOUNTER — TELEPHONE (OUTPATIENT)
Dept: GASTROENTEROLOGY | Facility: MEDICAL CENTER | Age: 59
End: 2024-03-01

## 2024-03-01 NOTE — TELEPHONE ENCOUNTER
Drea Becker,    This is [facility] confirming your upcoming procedure:  Colonoscopy for 03/14/2024 with Dr. Toure.    Please keep in mind you will receive a phone call the day prior with your exact arrival time.     Now is a good time to review your prep instructions.  You will find a copy of the procedure prep instructions and the colonoscopy info sheet attached to this message.   If you have any questions regarding the diet or prep instructions please call [phone] Option 1      Please reply Yes to confirm.   For rescheduling please call the office at [phone] Option 1    Francine VILLASEÑOR   [facility]     Left voicemail and requested a call back.

## 2024-03-12 RX ORDER — SODIUM CHLORIDE 9 MG/ML
125 INJECTION, SOLUTION INTRAVENOUS CONTINUOUS
Status: CANCELLED | OUTPATIENT
Start: 2024-03-12

## 2024-03-14 ENCOUNTER — ANESTHESIA (OUTPATIENT)
Dept: GASTROENTEROLOGY | Facility: MEDICAL CENTER | Age: 59
End: 2024-03-14

## 2024-03-14 ENCOUNTER — ANESTHESIA EVENT (OUTPATIENT)
Dept: GASTROENTEROLOGY | Facility: MEDICAL CENTER | Age: 59
End: 2024-03-14

## 2024-03-14 ENCOUNTER — HOSPITAL ENCOUNTER (OUTPATIENT)
Dept: GASTROENTEROLOGY | Facility: MEDICAL CENTER | Age: 59
Setting detail: OUTPATIENT SURGERY
End: 2024-03-14
Payer: MEDICARE

## 2024-03-14 VITALS
HEART RATE: 77 BPM | WEIGHT: 159 LBS | RESPIRATION RATE: 18 BRPM | SYSTOLIC BLOOD PRESSURE: 102 MMHG | HEIGHT: 68 IN | DIASTOLIC BLOOD PRESSURE: 63 MMHG | OXYGEN SATURATION: 100 % | BODY MASS INDEX: 24.1 KG/M2 | TEMPERATURE: 97.8 F

## 2024-03-14 DIAGNOSIS — Z83.719 FAMILY HX COLONIC POLYPS: ICD-10-CM

## 2024-03-14 DIAGNOSIS — Z12.11 ENCOUNTER FOR SCREENING FOR MALIGNANT NEOPLASM OF COLON: ICD-10-CM

## 2024-03-14 PROCEDURE — 88305 TISSUE EXAM BY PATHOLOGIST: CPT | Performed by: PATHOLOGY

## 2024-03-14 PROCEDURE — 45385 COLONOSCOPY W/LESION REMOVAL: CPT | Performed by: STUDENT IN AN ORGANIZED HEALTH CARE EDUCATION/TRAINING PROGRAM

## 2024-03-14 RX ORDER — SODIUM CHLORIDE 9 MG/ML
125 INJECTION, SOLUTION INTRAVENOUS CONTINUOUS
Status: DISCONTINUED | OUTPATIENT
Start: 2024-03-14 | End: 2024-03-18 | Stop reason: HOSPADM

## 2024-03-14 RX ORDER — LIDOCAINE HYDROCHLORIDE 20 MG/ML
INJECTION, SOLUTION EPIDURAL; INFILTRATION; INTRACAUDAL; PERINEURAL AS NEEDED
Status: DISCONTINUED | OUTPATIENT
Start: 2024-03-14 | End: 2024-03-14

## 2024-03-14 RX ORDER — PROPOFOL 10 MG/ML
INJECTION, EMULSION INTRAVENOUS AS NEEDED
Status: DISCONTINUED | OUTPATIENT
Start: 2024-03-14 | End: 2024-03-14

## 2024-03-14 RX ADMIN — PROPOFOL 50 MG: 10 INJECTION, EMULSION INTRAVENOUS at 13:40

## 2024-03-14 RX ADMIN — PROPOFOL 50 MG: 10 INJECTION, EMULSION INTRAVENOUS at 13:47

## 2024-03-14 RX ADMIN — PROPOFOL 100 MG: 10 INJECTION, EMULSION INTRAVENOUS at 13:33

## 2024-03-14 RX ADMIN — PROPOFOL 50 MG: 10 INJECTION, EMULSION INTRAVENOUS at 13:51

## 2024-03-14 RX ADMIN — SODIUM CHLORIDE 125 ML/HR: 0.9 INJECTION, SOLUTION INTRAVENOUS at 12:42

## 2024-03-14 RX ADMIN — Medication 40 MG: at 13:42

## 2024-03-14 RX ADMIN — PROPOFOL 50 MG: 10 INJECTION, EMULSION INTRAVENOUS at 13:37

## 2024-03-14 RX ADMIN — PROPOFOL 50 MG: 10 INJECTION, EMULSION INTRAVENOUS at 13:43

## 2024-03-14 RX ADMIN — PROPOFOL 50 MG: 10 INJECTION, EMULSION INTRAVENOUS at 13:34

## 2024-03-14 RX ADMIN — LIDOCAINE HYDROCHLORIDE 60 MG: 20 INJECTION, SOLUTION EPIDURAL; INFILTRATION; INTRACAUDAL at 13:33

## 2024-03-14 NOTE — ANESTHESIA PREPROCEDURE EVALUATION
Procedure:  COLONOSCOPY    Relevant Problems   ANESTHESIA (within normal limits)      CARDIO   (+) Mixed hyperlipidemia   (+) Primary hypertension      NEURO/PSYCH   (+) Moderate episode of recurrent major depressive disorder (HCC)      PULMONARY   (+) Smoking greater than 20 pack years        Physical Exam    Airway    Mallampati score: II  TM Distance: >3 FB  Neck ROM: full     Dental       Cardiovascular  Rhythm: regular, Rate: normal    Pulmonary   Breath sounds clear to auscultation    Other Findings        Anesthesia Plan  ASA Score- 2     Anesthesia Type- IV sedation with anesthesia with ASA Monitors.         Additional Monitors:     Airway Plan:            Plan Factors-Exercise tolerance (METS): >4 METS.    Chart reviewed.   Existing labs reviewed. Patient summary reviewed.    Patient is a current smoker.  Patient instructed to abstain from smoking on day of procedure. Patient smoked on day of surgery.            Induction- intravenous.    Postoperative Plan-     Informed Consent- Anesthetic plan and risks discussed with patient.

## 2024-03-14 NOTE — ANESTHESIA POSTPROCEDURE EVALUATION
Post-Op Assessment Note    CV Status:  Stable    Pain management: adequate       Mental Status:  Alert and awake   Hydration Status:  Euvolemic   PONV Controlled:  Controlled   Airway Patency:  Patent     Post Op Vitals Reviewed: Yes    No anethesia notable event occurred.    Staff: Anesthesiologist               /63 (03/14/24 1400)    Temp      Pulse 61 (03/14/24 1400)   Resp 16 (03/14/24 1400)    SpO2 98 % (03/14/24 1400)

## 2024-03-14 NOTE — H&P
"History and Physical -  [facility]  Tammy Becker 58 y.o. male MRN: [id number]                  HPI: Tammy Becker is a 58 y.o. year old male who presents for family history of colon polyps      REVIEW OF SYSTEMS: Per the HPI, and otherwise unremarkable.    Historical Information   Past Medical History:   Diagnosis Date    Anxiety     Depression     H/O tooth extraction      Past Surgical History:   Procedure Laterality Date    TOOTH EXTRACTION  11/2023     Social History   Social History     Substance and Sexual Activity   Alcohol Use Yes    Comment: Social     Social History     Substance and Sexual Activity   Drug Use Yes    Frequency: 1.0 times per week    Types: Marijuana    Comment: Medical     Social History     Tobacco Use   Smoking Status Every Day    Current packs/day: 0.50    Average packs/day: 0.5 packs/day for 44.2 years (22.1 ttl pk-yrs)    Types: Cigarettes    Start date: 1980   Smokeless Tobacco Former    Types: Chew    Quit date: 2014     Family History   Problem Relation Age of Onset    Hypertension Mother     Parkinsonism Mother     Breast cancer Mother     Colon polyps Brother        Meds/Allergies       Current Outpatient Medications:     amLODIPine (NORVASC) 5 mg tablet    FLUoxetine (PROzac) 20 mg capsule    ibuprofen (MOTRIN) 200 mg tablet    polyethylene glycol (COLYTE) 4000 mL solution    Current Facility-Administered Medications:     sodium chloride 0.9 % infusion, 125 mL/hr, Intravenous, Continuous, 125 mL/hr at 03/14/24 1242    No Known Allergies    Objective     /82   Pulse 59   Temp 97.8 °F (36.6 °C) (Temporal)   Resp 17   Ht 5' 8\" (1.727 m)   Wt 72.1 kg (159 lb)   SpO2 98%   BMI 24.18 kg/m²       PHYSICAL EXAM    Gen: NAD  Head: NCAT  CV: RRR  CHEST: Clear  ABD: soft, NT/ND  EXT: no edema      ASSESSMENT/PLAN:  This is a 58 y.o. year old male here for colonoscopy, and he is stable and optimized for his procedure.        "

## 2024-03-18 PROCEDURE — 88305 TISSUE EXAM BY PATHOLOGIST: CPT | Performed by: PATHOLOGY

## 2024-08-12 ENCOUNTER — OFFICE VISIT (OUTPATIENT)
Dept: INTERNAL MEDICINE CLINIC | Facility: OTHER | Age: 59
End: 2024-08-12
Payer: COMMERCIAL

## 2024-08-12 VITALS
DIASTOLIC BLOOD PRESSURE: 88 MMHG | SYSTOLIC BLOOD PRESSURE: 134 MMHG | HEART RATE: 87 BPM | WEIGHT: 155 LBS | TEMPERATURE: 98.1 F | OXYGEN SATURATION: 99 % | BODY MASS INDEX: 23.57 KG/M2

## 2024-08-12 DIAGNOSIS — F17.210 SMOKING GREATER THAN 20 PACK YEARS: ICD-10-CM

## 2024-08-12 DIAGNOSIS — H61.23 BILATERAL IMPACTED CERUMEN: ICD-10-CM

## 2024-08-12 DIAGNOSIS — E78.2 MIXED HYPERLIPIDEMIA: ICD-10-CM

## 2024-08-12 DIAGNOSIS — F33.1 MODERATE EPISODE OF RECURRENT MAJOR DEPRESSIVE DISORDER (HCC): ICD-10-CM

## 2024-08-12 DIAGNOSIS — I10 PRIMARY HYPERTENSION: Primary | ICD-10-CM

## 2024-08-12 PROCEDURE — 99214 OFFICE O/P EST MOD 30 MIN: CPT

## 2024-08-12 PROCEDURE — 69210 REMOVE IMPACTED EAR WAX UNI: CPT

## 2024-08-12 RX ORDER — AMLODIPINE BESYLATE 5 MG/1
5 TABLET ORAL DAILY
Qty: 90 TABLET | Refills: 1 | Status: SHIPPED | OUTPATIENT
Start: 2024-08-12

## 2024-08-12 NOTE — PROGRESS NOTES
Ambulatory Visit  Name: Tammy Becker      : 1965      MRN: [id number]  Encounter Provider: Alba Trejo PA-C  Encounter Date: 2024   Encounter department: [facility] [place]    Assessment & Plan   1. Primary hypertension  Assessment & Plan:  Typically well controlled with amlodipine 5 mg daily, patient has not taken medication today.  /88  Continue amlodipine 5 mg daily  Advised to monitor blood pressure at home.  Goal BP is < 130/80.  Contact our office for consistent elevations.    Recommend low sodium diet.  Exercise 30 minutes 5 times a week as tolerated.  Recommend yearly eye exam.    Orders:  -     amLODIPine (NORVASC) 5 mg tablet; Take 1 tablet (5 mg total) by mouth daily  2. Moderate episode of recurrent major depressive disorder (HCC)  Assessment & Plan:  Controlled with Prozac 20 mg daily.  Notes occasional ups and downs, but feels controlled at current dose.  Not interested in dose increase  No SI/HI reported  Currently on wait list for psychiatry services  Follow-up 6 months or sooner if symptoms worsen  Orders:  -     FLUoxetine (PROzac) 20 mg capsule; Take 1 capsule (20 mg total) by mouth daily  3. Smoking greater than 20 pack years  Assessment & Plan:  Smoking half pack per day.  Advised cessation  Patient not interested in medication options send  CT lung cancer screening in chart  4. Mixed hyperlipidemia  Assessment & Plan:  Total cholesterol 201, triglycerides 104, HDL 63,  on last labs   ASCVD risk score 13.2%  Advised diet and exercise.  Advised decreasing intake of cholesterol/fat.  Increase lean protein intake, increase fiber intake  Advised smoking cessation  Go for repeat labs   5. Bilateral impacted cerumen  Comments:  Cerumen removed using irrigation.  Patient tolerated procedure well       History of Present Illness     Patient is a 58-year-old male presenting to the office for 6-month follow-up  Labs not completed prior to  follow-up    Hypertension  Well-controlled with amlodipine 5 mg daily  Has not taken BP pill yet today   Has not been taking BP ay home    Hyperlipidemia  Total cholesterol 201, triglycerides 104, HDL 63,  last labs  Patient not interested in starting cholesterol-lowering medication at previous visit     Depression  Currently on Prozac 20 mg daily, feels good at current dose   On wait list for psychiatry     Tobacco cessation  Smoking 1/2 ppd   CT lung cancer screening not completed    Colonoscopy March 2024: polyps, repeat 3 years             Review of Systems   Constitutional:  Negative for chills, fatigue and fever.   HENT:  Negative for congestion, ear pain, postnasal drip, rhinorrhea, sinus pressure, sore throat and trouble swallowing.    Eyes:  Negative for pain and visual disturbance.   Respiratory:  Negative for cough, chest tightness, shortness of breath and wheezing.    Cardiovascular:  Negative for chest pain, palpitations and leg swelling.   Gastrointestinal:  Negative for abdominal pain, blood in stool, nausea and vomiting.   Genitourinary:  Negative for difficulty urinating, dysuria and hematuria.   Musculoskeletal:  Negative for arthralgias and back pain.   Skin:  Negative for color change, rash and wound.   Neurological:  Negative for dizziness, weakness, light-headedness, numbness and headaches.   Psychiatric/Behavioral:  Negative for dysphoric mood and sleep disturbance. The patient is not nervous/anxious.    All other systems reviewed and are negative.    Medical History Reviewed by provider this encounter:  Tobacco  Allergies  Meds  Problems  Med Hx  Surg Hx  Fam Hx       Objective     /88 (BP Location: Right arm, Patient Position: Sitting)   Pulse 87   Temp 98.1 °F (36.7 °C) (Temporal)   Wt 70.3 kg (155 lb)   SpO2 99%   BMI 23.57 kg/m²     Physical Exam  Vitals and nursing note reviewed.   Constitutional:       General: He is not in acute distress.     Appearance: Normal  appearance. He is not ill-appearing.   HENT:      Head: Normocephalic and atraumatic.      Right Ear: Tympanic membrane, ear canal and external ear normal. There is impacted cerumen.      Left Ear: Tympanic membrane, ear canal and external ear normal. There is impacted cerumen.      Nose: Nose normal.      Mouth/Throat:      Mouth: Mucous membranes are moist.      Pharynx: Oropharynx is clear.   Eyes:      General: No scleral icterus.     Conjunctiva/sclera: Conjunctivae normal.   Cardiovascular:      Rate and Rhythm: Normal rate and regular rhythm.      Heart sounds: Normal heart sounds. No murmur heard.     No friction rub. No gallop.   Pulmonary:      Effort: Pulmonary effort is normal. No respiratory distress.      Breath sounds: Normal breath sounds. No wheezing, rhonchi or rales.   Chest:      Chest wall: No tenderness.   Musculoskeletal:      Right lower leg: No edema.      Left lower leg: No edema.   Skin:     General: Skin is warm and dry.      Coloration: Skin is not pale.      Findings: No erythema.   Neurological:      General: No focal deficit present.      Mental Status: He is alert and oriented to person, place, and time. Mental status is at baseline.   Psychiatric:         Mood and Affect: Mood normal.         Behavior: Behavior normal.       Ear cerumen removal    Date/Time: 8/12/2024 7:00 AM    Performed by: Alba Trejo PA-C  Authorized by: Alba Trejo PA-C  Universal Protocol:  Consent: Verbal consent obtained.  Risks and benefits: risks, benefits and alternatives were discussed  Consent given by: patient  Patient understanding: patient states understanding of the procedure being performed  Required items: required blood products, implants, devices, and special equipment available  Patient identity confirmed: verbally with patient    Patient location:  Clinic  Procedure details:     Local anesthetic:  None    Location:  L ear and R ear    Procedure type: irrigation with instrumentation       Instrumentation: curette and forceps      Approach:  External  Post-procedure details:     Complication:  Bleeding (Canal irriation)    Hearing quality:  Improved    Patient tolerance of procedure:  Tolerated well, no immediate complications        Administrative Statements

## 2024-08-12 NOTE — ASSESSMENT & PLAN NOTE
Smoking half pack per day.  Advised cessation  Patient not interested in medication options send  CT lung cancer screening in chart

## 2024-08-12 NOTE — ASSESSMENT & PLAN NOTE
Typically well controlled with amlodipine 5 mg daily, patient has not taken medication today.  /88  Continue amlodipine 5 mg daily  Advised to monitor blood pressure at home.  Goal BP is < 130/80.  Contact our office for consistent elevations.    Recommend low sodium diet.  Exercise 30 minutes 5 times a week as tolerated.  Recommend yearly eye exam.

## 2024-08-12 NOTE — ASSESSMENT & PLAN NOTE
Total cholesterol 201, triglycerides 104, HDL 63,  on last labs   ASCVD risk score 13.2%  Advised diet and exercise.  Advised decreasing intake of cholesterol/fat.  Increase lean protein intake, increase fiber intake  Advised smoking cessation  Go for repeat labs

## 2024-08-12 NOTE — ASSESSMENT & PLAN NOTE
Controlled with Prozac 20 mg daily.  Notes occasional ups and downs, but feels controlled at current dose.  Not interested in dose increase  No SI/HI reported  Currently on wait list for psychiatry services  Follow-up 6 months or sooner if symptoms worsen

## 2024-09-12 ENCOUNTER — TELEPHONE (OUTPATIENT)
Age: 59
End: 2024-09-12

## 2024-09-12 NOTE — TELEPHONE ENCOUNTER
Contacted patient for Talk Therapy  to verify needs of services in attempts to offer patient an appointment at Available Office. Writer verified N/A - NO ANSWER. Writer JAYMEM and left callback number [phone]; option 3.    1st call attempt

## 2024-09-12 NOTE — TELEPHONE ENCOUNTER
"Behavioral Health Outpatient Intake Questions    Referred By   : PCP    Please advise interviewee that they need to answer all questions truthfully to allow for best care, and any misrepresentations of information may affect their ability to be seen at this clinic   => Was this discussed? Yes       Behavioral Health Outpatient Intake History -     Presenting Problem (in patient's own words):   Depression    Are there any communication barriers for this patient?     No                                               If yes, please describe barriers:   If there is a unique situation, please refer to Lino Schrader/Karmen Rojas for final determination.    Are you taking any psychiatric medications? YES   Prozac - pCP    Has the Patient previously received outpatient Talk Therapy or Medication Management from [facility]  No         Has the Patient abused alcohol or other substances in the last 6 months ? No    Legal History-     Is this treatment court ordered? No       Has the Patient been convicted of a felony?  NO      ACCEPTED as a patient Yes  If \"Yes\" Appointment Date: 10/9 at 900am with Dr. Cortés    Referred Elsewhere? No      Name of Insurance Co: Medicaid [facility]  Insurance ID# [id number]  Insurance Phone #   If ins is primary or secondary? Primary    Pt given [employer] Phone Number to Get Mental Health coverage      NP Packet sent to [email or web address]  "

## 2024-09-18 ENCOUNTER — TELEPHONE (OUTPATIENT)
Dept: PSYCHIATRY | Facility: CLINIC | Age: 59
End: 2024-09-18

## 2024-09-18 NOTE — TELEPHONE ENCOUNTER
One week follow up call for New Patient appointment with   Mandeep Cortés MD   on 10/09/2024 was made on 09/18/2024. Writer informed patient of New Patient paperwork needing to be completed 5 days prior to the appointment. Writer confirmed paperwork has been sent via Mail.    Appointment was made on: 09/12/2024

## 2025-01-29 ENCOUNTER — TELEPHONE (OUTPATIENT)
Dept: INTERNAL MEDICINE CLINIC | Facility: OTHER | Age: 60
End: 2025-01-29

## 2025-01-29 DIAGNOSIS — F33.1 MODERATE EPISODE OF RECURRENT MAJOR DEPRESSIVE DISORDER (HCC): ICD-10-CM

## 2025-01-29 RX ORDER — FLUOXETINE 40 MG/1
40 CAPSULE ORAL DAILY
Qty: 30 CAPSULE | Refills: 0 | Status: SHIPPED | OUTPATIENT
Start: 2025-01-29

## 2025-01-29 NOTE — TELEPHONE ENCOUNTER
Patient is requesting an increase of Fluoxetine. He increased himself a couple months ago. He is requesting 40mg. He took his last 2 capsules today. Please expedite prescription. Patient uses [facility]/pharmacy #[id number] - [address] [phone]

## 2025-02-17 ENCOUNTER — OFFICE VISIT (OUTPATIENT)
Dept: INTERNAL MEDICINE CLINIC | Facility: OTHER | Age: 60
End: 2025-02-17

## 2025-02-17 VITALS
SYSTOLIC BLOOD PRESSURE: 130 MMHG | HEART RATE: 76 BPM | RESPIRATION RATE: 18 BRPM | DIASTOLIC BLOOD PRESSURE: 78 MMHG | BODY MASS INDEX: 22.97 KG/M2 | HEIGHT: 68 IN | TEMPERATURE: 97.4 F | OXYGEN SATURATION: 99 % | WEIGHT: 151.6 LBS

## 2025-02-17 DIAGNOSIS — Z59.86 FINANCIAL INSECURITY: ICD-10-CM

## 2025-02-17 DIAGNOSIS — F33.1 MODERATE EPISODE OF RECURRENT MAJOR DEPRESSIVE DISORDER (HCC): ICD-10-CM

## 2025-02-17 DIAGNOSIS — I10 PRIMARY HYPERTENSION: ICD-10-CM

## 2025-02-17 DIAGNOSIS — Z12.5 SCREENING FOR PROSTATE CANCER: ICD-10-CM

## 2025-02-17 DIAGNOSIS — F17.210 SMOKING GREATER THAN 20 PACK YEARS: ICD-10-CM

## 2025-02-17 DIAGNOSIS — E78.2 MIXED HYPERLIPIDEMIA: ICD-10-CM

## 2025-02-17 DIAGNOSIS — Z59.71 INSURANCE COVERAGE PROBLEMS: ICD-10-CM

## 2025-02-17 DIAGNOSIS — Z00.00 ANNUAL PHYSICAL EXAM: Primary | ICD-10-CM

## 2025-02-17 PROCEDURE — 99396 PREV VISIT EST AGE 40-64: CPT

## 2025-02-17 RX ORDER — AMLODIPINE BESYLATE 5 MG/1
5 TABLET ORAL DAILY
Qty: 90 TABLET | Refills: 1 | Status: SHIPPED | OUTPATIENT
Start: 2025-02-17

## 2025-02-17 RX ORDER — FLUOXETINE 40 MG/1
40 CAPSULE ORAL DAILY
Qty: 30 CAPSULE | Refills: 0 | Status: SHIPPED | OUTPATIENT
Start: 2025-02-17

## 2025-02-17 SDOH — ECONOMIC STABILITY - INCOME SECURITY: FINANCIAL INSECURITY: Z59.86

## 2025-02-17 NOTE — PATIENT INSTRUCTIONS
"Patient Education     Routine physical for adults   The Basics   Written by the doctors and editors at [facility]   What is a physical? -- A physical is a routine visit, or \"check-up,\" with your doctor. You might also hear it called a \"wellness visit\" or \"preventive visit.\"  During each visit, the doctor will:   Ask about your physical and mental health   Ask about your habits, behaviors, and lifestyle   Do an exam   Give you vaccines if needed   Talk to you about any medicines you take   Give advice about your health   Answer your questions  Getting regular check-ups is an important part of taking care of your health. It can help your doctor find and treat any problems you have. But it's also important for preventing health problems.  A routine physical is different from a \"sick visit.\" A sick visit is when you see a doctor because of a health concern or problem. Since physicals are scheduled ahead of time, you can think about what you want to ask the doctor.  How often should I get a physical? -- It depends on your age and health. In general, for people age 21 years and older:   If you are younger than 50 years, you might be able to get a physical every 3 years.   If you are 50 years or older, your doctor might recommend a physical every year.  If you have an ongoing health condition, like diabetes or high blood pressure, your doctor will probably want to see you more often.  What happens during a physical? -- In general, each visit will include:   Physical exam - The doctor or nurse will check your height, weight, heart rate, and blood pressure. They will also look at your eyes and ears. They will ask about how you are feeling and whether you have any symptoms that bother you.   Medicines - It's a good idea to bring a list of all the medicines you take to each doctor visit. Your doctor will talk to you about your medicines and answer any questions. Tell them if you are having any side effects that bother you. You " "should also tell them if you are having trouble paying for any of your medicines.   Habits and behaviors - This includes:   Your diet   Your exercise habits   Whether you smoke, drink alcohol, or use drugs   Whether you are sexually active   Whether you feel safe at home  Your doctor will talk to you about things you can do to improve your health and lower your risk of health problems. They will also offer help and support. For example, if you want to quit smoking, they can give you advice and might prescribe medicines. If you want to improve your diet or get more physical activity, they can help you with this, too.   Lab tests, if needed - The tests you get will depend on your age and situation. For example, your doctor might want to check your:   Cholesterol   Blood sugar   Iron level   Vaccines - The recommended vaccines will depend on your age, health, and what vaccines you already had. Vaccines are very important because they can prevent certain serious or deadly infections.   Discussion of screening - \"Screening\" means checking for diseases or other health problems before they cause symptoms. Your doctor can recommend screening based on your age, risk, and preferences. This might include tests to check for:   Cancer, such as breast, prostate, cervical, ovarian, colorectal, prostate, lung, or skin cancer   Sexually transmitted infections, such as chlamydia and gonorrhea   Mental health conditions like depression and anxiety  Your doctor will talk to you about the different types of screening tests. They can help you decide which screenings to have. They can also explain what the results might mean.   Answering questions - The physical is a good time to ask the doctor or nurse questions about your health. If needed, they can refer you to other doctors or specialists, too.  Adults older than 65 years often need other care, too. As you get older, your doctor will talk to you about:   How to prevent falling at " home   Hearing or vision tests   Memory testing   How to take your medicines safely   Making sure that you have the help and support you need at home  All topics are updated as new evidence becomes available and our peer review process is complete.  This topic retrieved from [employer] on: May 02, 2024.  Topic 350749 Version 1.0  Release: 32.4.3 - C32.122  © 2024 UpToDate, Inc. and/or its affiliates. All rights reserved.  Consumer Information Use and Disclaimer   Disclaimer: This generalized information is a limited summary of diagnosis, treatment, and/or medication information. It is not meant to be comprehensive and should be used as a tool to help the user understand and/or assess potential diagnostic and treatment options. It does NOT include all information about conditions, treatments, medications, side effects, or risks that may apply to a specific patient. It is not intended to be medical advice or a substitute for the medical advice, diagnosis, or treatment of a health care provider based on the health care provider's examination and assessment of a patient's specific and unique circumstances. Patients must speak with a health care provider for complete information about their health, medical questions, and treatment options, including any risks or benefits regarding use of medications. This information does not endorse any treatments or medications as safe, effective, or approved for treating a specific patient. UpToDate, Inc. and its affiliates disclaim any warranty or liability relating to this information or the use thereof.The use of this information is governed by the Terms of Use, available at https://www.woltersViadeouwer.com/en/know/clinical-effectiveness-terms. 2024© UpToDate, Inc. and its affiliates and/or licensors. All rights reserved.  Copyright   © 2024 UpToDate, Inc. and/or its affiliates. All rights reserved.

## 2025-02-17 NOTE — ASSESSMENT & PLAN NOTE
Discussed healthy diet and exercise habits  Discussed appropriate age based screenings  Immunizations reviewed  Discussed immunizations with patient.  He is interested, however we will try to obtain insurance coverage prior to obtaining vaccinations  Routine fasting labs ordered

## 2025-02-17 NOTE — ASSESSMENT & PLAN NOTE
Orders:    amLODIPine (NORVASC) 5 mg tablet; Take 1 tablet (5 mg total) by mouth daily    Comprehensive metabolic panel; Future    TSH, 3rd generation with Free T4 reflex; Future    CBC and differential; Future

## 2025-02-17 NOTE — PROGRESS NOTES
Adult Annual Physical  Name: Tammy Becker      : 1965      MRN: [id number]  Encounter Provider: Alba Trejo PA-C  Encounter Date: 2025   Encounter department: [facility] [place]    Assessment & Plan  Annual physical exam  Discussed healthy diet and exercise habits  Discussed appropriate age based screenings  Immunizations reviewed  Discussed immunizations with patient.  He is interested, however we will try to obtain insurance coverage prior to obtaining vaccinations  Routine fasting labs ordered         Smoking greater than 20 pack years    Orders:    CT lung screening program; Future    Moderate episode of recurrent major depressive disorder (HCC)    Orders:    FLUoxetine (PROzac) 40 MG capsule; Take 1 capsule (40 mg total) by mouth daily    Primary hypertension    Orders:    amLODIPine (NORVASC) 5 mg tablet; Take 1 tablet (5 mg total) by mouth daily    Comprehensive metabolic panel; Future    TSH, 3rd generation with Free T4 reflex; Future    CBC and differential; Future    Financial insecurity  Will refer to case management due to financial insecurity and insurance coverage issues  Orders:    Ambulatory Referral to Social Work Care Management Program; Future    Insurance coverage problems    Orders:    Ambulatory Referral to Social Work Care Management Program; Future    Screening for prostate cancer    Orders:    PSA, Total Screen; Future    Mixed hyperlipidemia    Orders:    Lipid Panel with Direct LDL reflex; Future      Immunizations and preventive care screenings were discussed with patient today. Appropriate education was printed on patient's after visit summary.    Discussed risks and benefits of prostate cancer screening. We discussed the controversial history of PSA screening for prostate cancer in the [place] as well as the risk of over detection and over treatment of prostate cancer by way of PSA screening.  The patient understands that PSA blood  testing is an imperfect way to screen for prostate cancer and that elevated PSA levels in the blood may also be caused by infection, inflammation, prostatic trauma or manipulation, urological procedures, or by benign prostatic enlargement.    The role of the digital rectal examination in prostate cancer screening was also discussed and I discussed with him that there is large interobserver variability in the findings of digital rectal examination.    Counseling:  Alcohol/drug use: discussed moderation in alcohol intake, the recommendations for healthy alcohol use, and avoidance of illicit drug use.  Dental Health: discussed importance of regular tooth brushing, flossing, and dental visits.  Injury prevention: discussed safety/seat belts, safety helmets, smoke detectors, carbon monoxide detectors, and smoking near bedding or upholstery.  Sexual health: discussed sexually transmitted diseases, partner selection, use of condoms, avoidance of unintended pregnancy, and contraceptive alternatives.  Exercise: the importance of regular exercise/physical activity was discussed. Recommend exercise 3-5 times per week for at least 30 minutes.       Depression Screening and Follow-up Plan: Patient was screened for depression during today's encounter. They screened negative with a PHQ-9 score of 0.      Tobacco Cessation Counseling: Tobacco cessation counseling was provided. The patient is sincerely urged to quit consumption of tobacco. He is not ready to quit tobacco.         History of Present Illness     Adult Annual Physical:  Patient presents for annual physical. Patient is a 51-year-old male coming into the office today for annual physical  He has no concerns today    Tobacco: 0.5ppd   Alcohol: 4 beers per week  Drugs: marijuana occasionally .     Diet and Physical Activity:  - Diet/Nutrition: well balanced diet and consuming 3-5 servings of fruits/vegetables daily.  - Exercise: no formal exercise. physical  "labor    Depression Screening:    - PHQ-9 Score: 0    General Health:  - Sleep: sleeps well and 7-8 hours of sleep on average.  - Hearing: normal hearing bilateral ears.  - Vision: goes for regular eye exams and wears glasses and contacts.  - Dental: regular dental visits and brushes teeth twice daily.     Health:  - History of STDs: no.   - Urinary symptoms: none.     Review of Systems   Constitutional:  Negative for chills, fatigue and fever.   HENT:  Negative for congestion, ear pain, postnasal drip, rhinorrhea, sinus pressure, sore throat and trouble swallowing.    Eyes:  Negative for pain and visual disturbance.   Respiratory:  Negative for cough, chest tightness, shortness of breath and wheezing.    Cardiovascular:  Negative for chest pain, palpitations and leg swelling.   Gastrointestinal:  Negative for abdominal pain, blood in stool, constipation, diarrhea, nausea and vomiting.   Genitourinary:  Negative for difficulty urinating, dysuria, frequency, hematuria and urgency.   Musculoskeletal:  Negative for arthralgias and back pain.   Neurological:  Negative for dizziness, weakness, light-headedness, numbness and headaches.   Psychiatric/Behavioral:  Negative for dysphoric mood, sleep disturbance and suicidal ideas. The patient is not nervous/anxious.    All other systems reviewed and are negative.    Medical History Reviewed by provider this encounter:  Tobacco  Allergies  Meds  Problems  Med Hx  Surg Hx  Fam Hx     .    Objective   /78 (BP Location: Right arm, Patient Position: Sitting, Cuff Size: Standard)   Pulse 76   Temp (!) 97.4 °F (36.3 °C) (Temporal)   Resp 18   Ht 5' 8\" (1.727 m)   Wt 68.8 kg (151 lb 9.6 oz)   SpO2 99%   BMI 23.05 kg/m²     Physical Exam  Vitals and nursing note reviewed.   Constitutional:       General: He is not in acute distress.     Appearance: Normal appearance. He is not ill-appearing.   HENT:      Head: Normocephalic and atraumatic.      Right Ear: " Tympanic membrane, ear canal and external ear normal.      Left Ear: Tympanic membrane, ear canal and external ear normal.      Nose: Nose normal. No rhinorrhea.      Mouth/Throat:      Mouth: Mucous membranes are moist.      Pharynx: Oropharynx is clear. No oropharyngeal exudate or posterior oropharyngeal erythema.   Eyes:      General: No scleral icterus.     Extraocular Movements: Extraocular movements intact.      Conjunctiva/sclera: Conjunctivae normal.      Pupils: Pupils are equal, round, and reactive to light.   Cardiovascular:      Rate and Rhythm: Normal rate and regular rhythm.      Heart sounds: Normal heart sounds. No murmur heard.     No friction rub. No gallop.   Pulmonary:      Effort: Pulmonary effort is normal. No respiratory distress.      Breath sounds: Normal breath sounds. No wheezing, rhonchi or rales.   Chest:      Chest wall: No tenderness.   Abdominal:      Palpations: Abdomen is soft.      Tenderness: There is no abdominal tenderness.   Musculoskeletal:      Cervical back: Normal range of motion. No tenderness.      Right lower leg: No edema.      Left lower leg: No edema.   Lymphadenopathy:      Cervical: No cervical adenopathy.   Skin:     General: Skin is warm and dry.      Coloration: Skin is not pale.      Findings: No erythema.   Neurological:      General: No focal deficit present.      Mental Status: He is alert and oriented to person, place, and time. Mental status is at baseline.      Cranial Nerves: No cranial nerve deficit.      Motor: No weakness.      Coordination: Coordination normal.   Psychiatric:         Mood and Affect: Mood normal.         Behavior: Behavior normal.           Disclaimer: This note was generated with voice recognition software.  Phonetic, grammatical, and spelling errors may be present as a result.  Please contact provider with any concerns or questions

## 2025-02-19 ENCOUNTER — PATIENT OUTREACH (OUTPATIENT)
Dept: CASE MANAGEMENT | Facility: OTHER | Age: 60
End: 2025-02-19

## 2025-02-19 NOTE — PROGRESS NOTES
JUSTIN LITTLE reviewed chart. Pt was referred by PCP d/t not having health insurance.     JUSTIN LITTLE placed call to pt and pt did not answer. JUSTIN LITTLE left a message and will try to reach pt again within one week if pt does not call back sooner.

## 2025-02-24 ENCOUNTER — PATIENT OUTREACH (OUTPATIENT)
Dept: CASE MANAGEMENT | Facility: OTHER | Age: 60
End: 2025-02-24

## 2025-02-24 NOTE — PROGRESS NOTES
JUSTIN LITTLE made second phone outreach attempt to patient to f/u on referral from pt's PCP for pt not having insurance. Patient did not answer. JUSTIN LITTLE left message asking patient to return call. UTR letter sent via mail. Referral closed at this time. JUSTIN LITTLE will remain available for any needs.

## 2025-02-24 NOTE — LETTER
[address] [facility] [address]  [phone]    Re: Unable to Reach   2/24/2025       Dear Tammy,    I am a [facility]  and wanted to be certain you had information to contact me should you desire assistance with or have questions about non-medical aspects of your care such as [but not limited to] medical insurance, housing, transportation, material needs, or emergency needs.  If I do not have an answer I will assist you in finding the appropriate agency or individual who can help.      Please feel free to contact me at [phone]. Thank You.    Sincerely,         Zenaida Brandon LCSW

## 2025-04-03 DIAGNOSIS — F33.1 MODERATE EPISODE OF RECURRENT MAJOR DEPRESSIVE DISORDER (HCC): ICD-10-CM

## 2025-04-03 RX ORDER — FLUOXETINE HYDROCHLORIDE 40 MG/1
40 CAPSULE ORAL DAILY
Qty: 90 CAPSULE | Refills: 1 | Status: SHIPPED | OUTPATIENT
Start: 2025-04-03